# Patient Record
(demographics unavailable — no encounter records)

---

## 2018-05-02 NOTE — RADIOLOGY REPORT (SQ)
EXAM DESCRIPTION:  MRI LUMBAR SPINE WITHOUT



COMPLETED DATE/TIME:  5/2/2018 8:35 am



REASON FOR STUDY:  DEGENERATIVE DISC DISEASE (M51.37) M51.37  OTHER INTERVERTEBRAL DISC DEGENERATION,
 LUMBOSACRAL R



COMPARISON:  None.



TECHNIQUE:  Sagittal and Axial imaging includes T1, T2, STIR and gradient echo sequences. Coronal T2/
HASTE imaging.



LIMITATIONS:  None.



FINDINGS:  VISUALIZED UPPER ABDOMEN:  Limited evaluation. No acute or suspicious findings suggested.

SEGMENTATION: No transitional anatomy. The lowest well-developed disc space is labeled L5-S1.

ALIGNMENT: Anatomic.

VERTEBRAE: Just under 40% height loss at the L1 level.  There is upper endplate fracture line with as
sociated marrow edema.  Minimal retropulsion but no significant central canal narrowing at this level
.  Chronic appearing upper endplate depression at L5.

BONE MARROW: As above.  Marrow signal otherwise looks normal.

DISC SIGNAL: Diminished signal and height at several levels.

POSTERIOR ELEMENTS:  No pars defect.  Mild multilevel facet arthropathy.

HARDWARE: None in the spine.

CORD AND CONUS: Normal in size and signal intensity. Conus at the appropriate level.

SOFT TISSUES: No aortic aneurysm seen. No bulky retroperitoneal adenopathy or mass. No paraspinal mas
s or fluid.

L1-L2: No significant spinal stenosis or exit foraminal stenosis.

L2-L3: No significant spinal stenosis or exit foraminal stenosis.

L3-L4: Mild disc bulge and mild facet overgrowth with slight central narrowing.

L4-L5: Mild broad central protrusion indents the ventral thecal sac.  Facet overgrowth with generally
 mild central canal narrowing.

L5-S1: Tiny central annular fissure.  Minimal protrusion.  No impingement or stenosis.

LOWER THORACIC: Incompletely imaged.  No stenosis seen.

SACRUM: Visualized upper sacrum intact.

OTHER: No other significant findings.



IMPRESSION:  1. Recent appearing L1 mild compression fracture as above.  No significant retropulsion.
  2. Spondylosis otherwise.  No high-grade stenosis.



TECHNICAL DOCUMENTATION:  JOB ID:  [id number]

 2011 [employer]- All Rights Reserved



Reading location - IP/workstation name: [email or web address]

## 2018-05-16 NOTE — WOMENS IMAGING REPORT
EXAM DESCRIPTION:  BONE DENSITY HIP/SPINE



COMPLETED DATE/TIME:  5/16/2018 10:23 am



REASON FOR STUDY:  AGE-RELATED OSTEOPROSIS; M80.08XA M80.08XA  AGE-REL OSTEOPOR W CURRENT PATH FRACTU
RE, VERTEBRA(



COMPARISON:   None.



TECHNIQUE:  Dual-Energy X-ray Absorptiometry (DEXA) of the AP Spine and Hip.



LIMITATIONS:  None.



FINDINGS:  LUMBAR SPINE:

The bone mineral density (BMD) measured from L1-L4 in the AP projection correlates with a T-score of 
-1.8, which is osteopenic as defined by the World Health Organization.

HIP:

The bone mineral density (BMD) measured in the left femoral neck at the hip correlates with a T-score
 of -2.9, which is osteoporotic as defined by the World Health Organization.



IMPRESSION:  1. LUMBAR SPINE: Osteopenic

2.  HIP: Osteoporotic



COMMENT:  The World Health Organization defines low BMD as follows:

T-score:

Normal:  Greater than -1.0

Osteopenia: Between -1.0 and -2.5

Osteoporosis:  Less than -2.5 without fractures

Established osteoporosis:  Less than -2.5 with fractures

In general, you may wish to consider:

Diagnosis          Treatment                     Follow-up DEXA

Normal BMD      Prevention                    2-3 years

Osteopenia       Prevention/Therapy        1-2 years

Osteoporosis     Therapy                        Yearly



TECHNICAL DOCUMENTATION:  JOB ID:  [id number]

 2011 [employer]- All Rights Reserved



Reading location - IP/workstation name: [facility]

## 2018-12-10 NOTE — OPERATIVE REPORT
Operative Report


DATE OF SURGERY: 12/10/18


Operative Report: 





The risks, benefits and alternatives of the procedure including the risk of 

bleeding, perforation requiring surgery are explained to the patient in detail 

and informed consent is obtained.  Patient was taken back to the endoscopy 

suite and placed in the left, lateral decubital position.  Timeout was called.  

Propofol medication is administered.  Rectal examination is done which did not 

reveal any masses, tears or fissures.  An Olympus videoscope is introduced into 

the patient's rectum.  The scope was then carefully advanced all the way to the 

cecum.  The cecum was identified by the usual anatomical landmarks including 

the ileocecal valve as well as the appendiceal office.  Photodocumentation was 

obtained.  The scope was then sequentially pulled back via the various segments 

of the colon including the ascending colon, hepatic flexure, transverse colon, 

splenic flexure, descending colon and finally into the rectosigmoid portions of 

the colon.  Retroflexion maneuver is performed.


PREOPERATIVE DIAGNOSIS: Personal history of polyp


POSTOPERATIVE DIAGNOSIS: Colon polyps x2 in the hepatic flexure the removed via 

snare polypectomy.  Diverticulosis without any evidence of diverticulitis at 

this point.  Internal hemorrhoids


OPERATION: Colonoscopy with snare polypectomy


SURGEON: PANCHO GUIDRY


ANESTHESIA: LMAC


TISSUE REMOVED OR ALTERED: As noted above.


COMPLICATIONS: 





None.


ESTIMATED BLOOD LOSS: None.


INTRAOPERATIVE FINDINGS: As noted above.


PROCEDURE: 





Patient tolerated the procedure well.


No immediate postprocedure complications are noted.


Patient discharged in good condition.


Discharge date 12/10/2018.


Discharge diet: Regular.


Discharge activity: Regular.


2-3-week follow-up to discuss findings


3-5-year surveillance colonoscopy.


I will wait on pathology.


Patient is instructed to call the office or proceed to the emergency room 

should there be any further problems or questions.

## 2019-01-16 NOTE — EKG REPORT
SEVERITY:- OTHERWISE NORMAL ECG -

SINUS RHYTHM

BORDERLINE LEFT AXIS DEVIATION

LOW VOLTAGE IN FRONTAL LEADS

:

Confirmed by: Giovany Alston 16-Jan-2019 17:55:13

## 2019-01-16 NOTE — RADIOLOGY REPORT (SQ)
EXAM DESCRIPTION:  CHEST PA/LATERAL



COMPLETED DATE/TIME:  1/16/2019 9:58 am



REASON FOR STUDY:  PRE-OP K64.9  UNSPECIFIED HEMORRHOIDS Z79.01  LONG TERM (CURRENT) USE OF ANTICOAGU
LANTS



COMPARISON:  None.



NUMBER OF VIEWS:  Two view.



TECHNIQUE:  Frontal and lateral radiographic views of the chest acquired.



LIMITATIONS:  None.



FINDINGS:  LUNGS AND PLEURA: No opacities, masses or pneumothorax. No pleural effusion. Attenuated bl
ood vessels and flattened miya-diaphragms.

MEDIASTINUM AND HILAR STRUCTURES: No masses.  No contour abnormalities.

HEART AND VASCULAR STRUCTURES: Heart normal in size and contour.  No evidence for failure.

BONES: No acute findings.

HARDWARE: None in the chest.

OTHER: No other significant finding.



IMPRESSION:  COPD.  NO ACUTE RADIOGRAPHIC FINDING IN THE CHEST.



TECHNICAL DOCUMENTATION:  JOB ID:  [id number]

 2011 [employer]- All Rights Reserved



Reading location - IP/workstation name: [email or web address]

## 2019-01-24 NOTE — DISCHARGE SUMMARY
Discharge Summary (SDC)





- Discharge


Final Diagnosis: 





bleeding internal hemorrhoids


Date of Surgery: 01/24/19


Discharge Date: 01/24/19


Condition: Stable


Treatment or Instructions: 


d/c home. diet as tolerated. Activity: nonstrenuous. F/u 3 weeks. Fiber 

supplement BID. 5% lidocaine to rectum TID. Sitz baths in warm, soapy water BID 

and after BM's.


Referrals: 


RENNY BECERRIL MD [Primary Care Provider] - 


Discharge Diet: As Tolerated


Respiratory Treatments at Home: Deep Breathing/Coughing, Incentive Spirometer


Discharge Activity: Balance Activity w/Rest


Home Care Assistance: None Needed


Report the Following to Your Physician Immediately: Shortness of Breath, Nausea,

Vomiting, Increase in Pain, Fever over 101 Degrees, Unusual Bleeding, Redness, 

Swelling, Warmth, Increased Soreness

## 2019-01-25 NOTE — OPERATIVE REPORT
Nonrecallable Operative Report


DATE OF SURGERY: 01/24/19


PREOPERATIVE DIAGNOSIS: Bleeding internal hemorrhoids


POSTOPERATIVE DIAGNOSIS: same


OPERATION: Rubber band ligation of internal hemorrhoids x4


SURGEON: PORSHA DUMAS


1ST ASSISTANT: PEYTON CALHOUN


ANESTHESIA: LMAC


TISSUE REMOVED OR ALTERED: none


COMPLICATIONS: 





none apparent


ESTIMATED BLOOD LOSS: minimal


PROCEDURE: 





Procedure in detail: After informed consent was obtained, the patient was 

brought to the operating room and laid in the left lateral decubitus position.  

The anoscope was inserted into the rectum.  Enlarged internal hemorrhoids were 

found in the right  anterior, right posterior, and left lateral columns.  Rubber

bands were placed around the hemorrhoids inside the rectum.  2 rubber bands were

placed in the left lateral position.  4 rubber bands were placed in total.  

After this was completed, the endoscope was removed, and the procedure was 

concluded.  All sponge, instrument, needle counts were correct x2.





Condition: Stable.





Peyton Calhoun PA-C was scrubbed and present the entirety of the procedure.  

She assisted with all portions of procedure including retraction, deployment of 

the rubber bands, placement of the dressing.

## 2020-02-17 NOTE — RADIOLOGY REPORT (SQ)
CLINICAL HISTORY:  hip fracture 



COMPARISON: None.



TECHNIQUE: XR CHEST 2 VIEWS 2/17/2020 4:19 AM CST



FINDINGS: 



Cardiac silhouette is normal in size. Lungs are clear without

consolidation, atelectasis, mass or edema. There is no pleural

effusion. There is no pneumothorax. There are no acute osseous

findings.



IMPRESSION: 



Clear lungs.

## 2020-02-17 NOTE — PDOC H&P
History of Present Illness


Admission Date/PCP: 


  02/17/20 06:29





  RENNY BECERRIL MD





History of Present Illness: 


KENTON THOMAS is a 71 year old female


71-year-old white female recently relocated from [facility] and a past 

medical history significant for osteoporosis who fell at home earlier today and 

sustained a right hip injury.  She was unable to weight-bear.  She is brought to

the emergency room by EMS.  In the emergency room x-rays indicated the presence 

of a displaced right femoral neck fracture.  Orthopedics is consulted for 

fracture management.





Past Medical History


Cardiac Medical History: 


   Denies: Coronary Artery Disease, Myocardial Infarction, Hypertension


Pulmonary Medical History: Reports: Asthma, Bronchitis, Chronic Obstructive 

Pulmonary Disease (COPD), Pneumonia


Neurological Medical History: 


   Denies: Seizures


Musculoskeltal Medical History: 


   Denies: Arthritis


Hematology: 


   Denies: Anemia





Past Surgical History


Past Surgical History: Reports: None





Social History


Information Source: Patient, [facility] Records


Smoking Status: Unknown if Ever Smoked





Family History


Family History: Reviewed & Not Pertinent


Parental Family History Reviewed: No


Children Family History Reviewed: No


Sibling(s) Family History Reviewed.: No





Medication/Allergy


Home Medications: 








Albuterol Sulfate [Ventolin Hfa 8 gm Mdi (1 Mdi/ER Disp)] 2 puff IH ASDIR PRN 

12/05/18 


Cyclobenzaprine HCl [Flexeril 10 mg Tablet] 10 mg PO QHS 12/05/18 


Gabapentin 600 mg PO TID 12/05/18 


Nitroglycerin 0.4 mg SL ASDIR PRN 12/05/18 


Tramadol HCl [Ultram] 50 mg PO TID 12/05/18 


Fiber [Fiber Off] 1 each PO QID 01/16/19 








Allergies/Adverse Reactions: 


                                        





Sulfa (Sulfonamide Antibiotics) Allergy (Severe, Verified 01/16/19 09:08)


   Anaphylaxis


aspirin Adverse Reaction (Severe, Verified 01/16/19 09:08)


   STOMACH UPSET











Review of Systems


All systems: as per PMH





Physical Exam


Vital Signs: 


                                        











Temp Pulse Resp BP Pulse Ox


 


       15   147/73 H  94 


 


       02/17/20 05:01  02/17/20 05:01  02/17/20 05:01








                                 Intake & Output











 02/15/20 02/16/20 02/17/20





 06:59 06:59 06:59


 


Weight   63.9 kg











Physical Exam: 





Moderately built middle-aged white female lying on ER gurney.   in a 

chair next to her.  Patient's in significant distress at this point.


General appearance: PRESENT: severe distress, well-developed, well-nourished


Head exam: PRESENT: normocephalic


Respiratory exam: PRESENT: unlabored


Cardiovascular exam: PRESENT: RRR


Pulses: PRESENT: +1 pedal pulses bilateral


Vascular exam: PRESENT: normal capillary refill


GI/Abdominal exam: PRESENT: soft


Rectal exam: PRESENT: deferred


Extremities exam: PRESENT: other - Right lower extremity is actually rotated and

shortened.  Distally there is brisk capillary refill and the patient is able to 

flex and extend her great toe.


Neurological exam: PRESENT: alert, awake, oriented to person, oriented to place,

oriented to time, oriented to situation.  ABSENT: motor sensory deficit


Psychiatric exam: PRESENT: appropriate affect, normal mood.  ABSENT: homicidal 

ideation, suicidal ideation


Skin exam: PRESENT: dry, intact, warm.  ABSENT: cyanosis, rash





Results


Laboratory Results: 


                                        





                                 02/17/20 04:50 





                                 02/17/20 04:50 





                                        











  02/17/20 02/17/20 02/17/20





  04:50 04:50 05:35


 


WBC  12.0 H  


 


RBC  4.46  


 


Hgb  14.5  


 


Hct  43.2  


 


MCV  97  


 


MCH  32.6  


 


MCHC  33.6  


 


RDW  13.6  


 


Plt Count  257  


 


Seg Neutrophils %  84.9 H  


 


Sodium   137.4 


 


Potassium   4.2 


 


Chloride   101 


 


Carbon Dioxide   31 H 


 


Anion Gap   5 


 


BUN   20 


 


Creatinine   0.96 


 


Est GFR ( Amer)   > 60 


 


Glucose   105 


 


Calcium   8.9 


 


Urine Color    YELLOW


 


Urine Appearance    CLEAR


 


Urine pH    7.0


 


Ur Specific Gravity    1.016


 


Urine Protein    NEGATIVE


 


Urine Glucose (UA)    NEGATIVE


 


Urine Ketones    NEGATIVE


 


Urine Blood    NEGATIVE


 


Urine Nitrite    NEGATIVE


 


Ur Leukocyte Esterase    NEGATIVE


 


Urine WBC (Auto)    1


 


Urine RBC (Auto)    15











Impressions: 


                                        





Hip/Pelvis X-Ray  02/17/20 00:00


IMPRESSION:     


Acute, minimally displaced fracture through the lateral aspect of


the right femoral neck.


 








Chest X-Ray  02/17/20 04:19


IMPRESSION: 


 


Clear lungs.


 











Status: Imported from PACS





Assessment & Plan





- Diagnosis


(1) Femoral neck fracture


Qualifiers: 


   Encounter type: initial encounter   Fracture type: closed   Laterality: right

  Qualified Code(s): S72.001A - Fracture of unspecified part of neck of right 

femur, initial encounter for closed fracture   


Is this a current diagnosis for this admission?: Yes   


Plan: 


Plan for right hip arthroplasty pending anesthesia clearance








(2) Osteoporosis


Is this a current diagnosis for this admission?: Yes   


Plan: 


Was documented with osteoporosis on a DEXA scan in 2018.  She states that she 

was unable to tolerate osteoporosis medication (? bisphosphonate) but has been 

taking calcium vitamin D in the interim.  Plan will be to to address her 

osteoporosis once we have dealt with the hip fracture.  








- Time


Time Spent: 50 to 70 Minutes


Anticipated discharge: Home with Homehealth


Within: within 72 hours

## 2020-02-17 NOTE — RADIOLOGY REPORT (SQ)
EXAM:

  XR Right Hip With Pelvis When Performed, 2 Views



EXAM DATE/TIME:

  02/17/2020 4:43 AM



CLINICAL HISTORY:

  The patient is 71 years old and is Female; fall; hip right pain



TECHNIQUE:

  Two views of the right hip with pelvis when performed.



COMPARISON:

  No relevant prior studies available.



FINDINGS:

  BONES/JOINTS:  The bilateral hip joints are well-maintained. No

dislocation.  There is an acute, minimally displaced fracture

through the lateral aspect of the right femoral neck. No

additional acute fractures visualized.

  SOFT TISSUES:  No significant soft tissue abnormalities

visualized.



IMPRESSION:     

Acute, minimally displaced fracture through the lateral aspect of

the right femoral neck.

## 2020-02-17 NOTE — EKG REPORT
SEVERITY:- ABNORMAL ECG -

SINUS RHYTHM

BORDERLINE LEFT AXIS DEVIATION

LOW VOLTAGE EKG

:

Confirmed by: Ian Olievr MD 17-Feb-2020 06:05:21

## 2020-02-17 NOTE — ER DOCUMENT REPORT
ED Hip Pain/Injury





- General


Chief Complaint: Hip Pain


Stated Complaint: INNER THIGH PAIN


Time Seen by Provider: 02/17/20 04:11


Primary Care Provider: 


RENNY BECERRIL MD [Primary Care Provider] - Follow up as needed


Notes: 





CHIEF COMPLAINT: Right hip pain status post fall





HPI: 71-year-old female with history of fibromyalgia presenting for evaluation 

of right hip pain status post fall.  Patient tripped and landed on the right 

hip.  Denies hitting her head.  States that she was not able to get up or 

weight-bear afterwards, EMS reports they did give 100 mcg fentanyl for pain.  

Patient denies knee pain or ankle pain.  She states she cannot fully straighten 

her leg secondary to the discomfort.





ROS: See HPI - all other systems were reviewed and are otherwise negative


Constitutional: no fever 


Eyes: no drainage, no blurred vision


ENT: no runny nose, no sore throat


Cardiovascular:  no chest pain 


Resp: no SOB, no cough


GI: no vomiting, no diarrhea, no abdominal pain


: no dysuria


Integumentary: no rash 


Allergy: no hives 


Musculoskeletal: + extremity pain or swelling 


Neurological: no numbness/tingling





MEDICATIONS: I agree with the patient medications as charted by the RN.





ALLERGIES: I agree with the allergies as charted by the RN.





PAST MEDICAL HISTORY/PAST SURGICAL HISTORY: Reviewed and agree as charted by RN.





SOCIAL HISTORY: Reviewed and agree as charted by RN.





FAMILY HISTORY: No significant familial comorbid conditions directly related to 

patient complaint





EXAM:


Reviewed vital signs as charted by RN.


CONSTITUTIONAL: Alert and oriented and responds appropriately to questions. 

Well-appearing; well-nourished, mild acute distress secondary to pain


HEAD: Normocephalic; atraumatic


EYES: PERRL; Conjunctivae clear, sclerae non-icteric


ENT: normal nose; no rhinorrhea; moist mucous membranes; pharynx without lesions

noted, no uvula edema or deviation, no tonsillar hypertrophy, phonation normal


NECK: Supple without meningismus; non-tender; no cervical lymphadenopathy, no 

masses


CARD: RRR; no murmurs, no clicks, no rubs, no gallops; symmetric distal pulses


RESP: Normal chest excursion without splinting or tachypnea; breath sounds clear

and equal bilaterally; no wheezes, no rhonchi, no rales, pulse oximetry 97% on 

room air not hypoxic


ABD/GI: Normal bowel sounds; non-distended; soft, non-tender, no rebound, no 

guarding; no palpable organomegaly or masses.


BACK:  The back appears normal and is non-tender to palpation, there is no CVA 

tenderness


EXT: Patient is unable to straighten the right leg at the hip secondary to 

complaints of pain.  She has moderate tenderness around the right hip girdle on 

palpation.  No discomfort to the distal femur, knee, right lower extremity.  

Sensation is intact in the distal right lower extremity to touch with capillary 

refill less than 3 seconds.  Dorsalis pedis and posterior tibial pulses are 

present in the right foot and ankle.   


SKIN: Normal color for age and race; warm; dry; good turgor; no acute lesions 

noted


NEURO: Motor and sensory function intact 


PSYCH: The patient's mood and manner are appropriate. Grooming and personal 

hygiene are appropriate.





MDM: 71-year-old female with a mechanical fall with probable right hip fracture 

will obtain x-ray


TRAVEL OUTSIDE OF THE [place] IN LAST 30 DAYS: No





- Related Data


Allergies/Adverse Reactions: 


                                        





Sulfa (Sulfonamide Antibiotics) Allergy (Severe, Verified 01/16/19 09:08)


   Anaphylaxis


aspirin Adverse Reaction (Severe, Verified 01/16/19 09:08)


   STOMACH UPSET











Past Medical History





- Social History


Smoking Status: Unknown if Ever Smoked


Family History: Reviewed & Not Pertinent





- Past Medical History


Cardiac Medical History: 


   Denies: Hx Coronary Artery Disease, Hx Heart Attack, Hx Hypertension


Pulmonary Medical History: Reports: Hx Asthma, Hx Bronchitis, Hx COPD, Hx 

Pneumonia


Neurological Medical History: Denies: Hx Cerebrovascular Accident, Hx Seizures


Musculoskeletal Medical History: Denies Hx Arthritis





- Immunizations


Hx Diphtheria, Pertussis, Tetanus Vaccination: Yes





Physical Exam





- Vital signs


Vitals: 


                                        











Resp Pulse Ox


 


 16   91 L


 


 02/17/20 04:11  02/17/20 04:11














Course





- Re-evaluation


Re-evalutation: 





02/17/20 04:58


Patient appears to have a femoral neck fracture on my review of her x-ray.  

Awaiting the official radiology review, will discuss with orthopedics for 

management


02/17/20 06:01


Patient with a right femoral neck fracture by radiologist, labs do not show 

acute emergent abnormalities, have paged Dr. Disla orthopedics


02/17/20 06:19


spoke with Dr. Disla, Orthopedics.  Case was discussed, they will admit to 

their service





- Vital Signs


Vital signs: 


                                        











Temp Pulse Resp BP Pulse Ox


 


       15   147/73 H  94 


 


       02/17/20 05:01  02/17/20 05:01  02/17/20 05:01














- Laboratory


Result Diagrams: 


                                 02/17/20 04:50





                                 02/17/20 04:50


Laboratory results interpreted by me: 


                                        











  02/17/20 02/17/20





  04:50 04:50


 


WBC  12.0 H 


 


Lymph % (Auto)  10.0 L 


 


Absolute Neuts (auto)  10.2 H 


 


Seg Neutrophils %  84.9 H 


 


Carbon Dioxide   31 H


 


Est GFR (MDRD) Non-Af   57 L














Discharge





- Discharge


Clinical Impression: 


Fall


Qualifiers:


 Encounter type: initial encounter Qualified Code(s): W19.XXXA - Unspecified 

fall, initial encounter





Femoral neck fracture


Qualifiers:


 Encounter type: initial encounter Fracture type: closed Laterality: right 

Qualified Code(s): S72.001A - Fracture of unspecified part of neck of right 

femur, initial encounter for closed fracture





Condition: Stable


Disposition: ADMITTED AS INPATIENT


Admitting Provider: Dr. Disla, Orthopedics


Unit Admitted: Surgical Floor


Referrals: 


RENNY BECERRIL MD [Primary Care Provider] - Follow up as needed

## 2020-02-17 NOTE — OPERATIVE REPORT
Operative Report


DATE OF SURGERY: 02/17/20


PREOPERATIVE DIAGNOSIS: Right femoral neck fracture


OPERATION: Right hip arthroplasty


SURGEON: JOEY MEADE


ANESTHESIA: Spinal


TISSUE REMOVED OR ALTERED: Femoral head to pathology


ESTIMATED BLOOD LOSS: 75


PROCEDURE: 





Implants used:


Femur: Davide Accolade 2 stem, size 4





Acetabular shell: 52  mm hemispherical shell





Liner: 86 mm flat cross-link polyethylene liner





Head: 36 mm chrome cobalt head +5 neck


The patient is placed in a left lateral decubitus position on the operating 

table.  The right lower extremity and hindquarter is prepped and draped in a 

sterile fashion.  A curvilinear incision was made over the greater trochanter a 

posterior approach the hip was taken.   the femoral neck transected using an 

oscillating saw and the femoral head is removed using a corkscrew..





Attention was next turned to the acetabulum.  Soft tissues cleared off the 

acetabulum using electrocautery.  The acetabulum was then prepared using a 

series of hemispherical reamers until a 52 millimeters reamer is seated.  

Subsequently a 52 millimeters Davide titanium hemispherical shell is impacted 

into position and secured with one screw.  A standard flat 36 millimeters cross-

link liner is impacted into the shell.  Attention was next turned to the femur.





Access is gained to the femoral canal using a box osteotome to the piriformis fo

ssa.  The femur is then prepared using a series of broaches until a number 4 

broach is seated.  A trial reduction was now performed using a 36 millimeters 

head with +5 neck.  Preoperative leg length was recreated and is excellent 

anterior posterior stability.  A decision was made to proceed with the above 

construct.





All trial implants were removed.  The wound is irrigated with pulsed lavage.  A 

number 4 stem is impacted into the femoral canal.  A trial reduction was again 

performed with a 36 mm head and a +5 neck.  Findings as previously.  The hip was

dislocated one last time and the final chrome-cobalt head is impacted onto the 

trunnion.  The hip was reduced.  Wound is copiously irrigated with pulsed 

lavage.  Sent closed in layers using interrupted Vicryl followed by staples.  A 

sterile dressing is applied and the patient's returned to recovery room in 

satisfactory patient.

## 2020-02-18 NOTE — PDOC PROGRESS REPORT
Subjective


Progress Note for:: 02/18/20


Reason For Visit: 


FALL,FEMORAL NECK FRACTURE


71-year-old white female now postop day 1 status post right hip arthroplasty for

femoral neck fracture.  Patient is comfortable this morning but pain control 

yesterday was problematic.  Patient was not seen by physical therapy yesterday 

because of the time of her arrival to the floor which was late





Physical Exam


Vital Signs: 


                                        











Temp Pulse Resp BP Pulse Ox


 


 36.9 C   94   16   126/46 H  91 L


 


 02/18/20 04:50  02/18/20 04:50  02/18/20 04:50  02/18/20 04:50  02/18/20 04:50








                                 Intake & Output











 02/17/20 02/18/20 02/19/20





 06:59 06:59 06:59


 


Intake Total  5692 


 


Output Total  4909 


 


Balance  783 


 


Weight 63.9 kg 66.1 kg 











Physical Exam: 





Patient is alert, oriented, and somewhat appropriate.  Apprehension when I 

placed my hand on her leg without moving the leg results in bracing on the bed 

rails as well as a trapeze anticipation of pain.


General appearance: PRESENT: no acute distress, mild distress


Head exam: PRESENT: normocephalic


Respiratory exam: PRESENT: unlabored


Cardiovascular exam: PRESENT: RRR


GI/Abdominal exam: PRESENT: soft


Rectal exam: PRESENT: deferred


Musculoskeletal exam: PRESENT: other - Right hip dressing clean dry and intact. 

Leg lengths are equal.  Distal neurovascular examination is intact.


Neurological exam: PRESENT: alert, awake, oriented to person, oriented to place,

oriented to time, oriented to situation.  ABSENT: motor sensory deficit


Psychiatric exam: PRESENT: anxious


Skin exam: PRESENT: dry, intact, warm.  ABSENT: cyanosis, rash





Results


Laboratory Results: 


                                        





                                 02/18/20 05:50 





                                 02/18/20 05:50 





                                        











  02/18/20 02/18/20





  05:50 05:50


 


WBC  9.0 


 


RBC  4.01 


 


Hgb  13.1 


 


Hct  38.9 


 


MCV  97 


 


MCH  32.8 


 


MCHC  33.8 


 


RDW  13.5 


 


Plt Count  170 


 


Sodium   136.1 L


 


Potassium   4.8


 


Chloride   103


 


Carbon Dioxide   30


 


Anion Gap   3 L


 


BUN   13


 


Creatinine   0.86


 


Est GFR (African Amer)   > 60


 


Glucose   118 H


 


Calcium   8.4











Impressions: 


                                        





Hip/Pelvis X-Ray  02/17/20 00:00


IMPRESSION:     


Acute, minimally displaced fracture through the lateral aspect of


the right femoral neck.


 








Chest X-Ray  02/17/20 04:19


IMPRESSION: 


 


Clear lungs.


 











Status: Imported from PACS





Assessment & Plan





- Diagnosis


(1) Femoral neck fracture


Qualifiers: 


   Encounter type: initial encounter   Fracture type: closed   Laterality: right

  Qualified Code(s): S72.001A - Fracture of unspecified part of neck of right 

femur, initial encounter for closed fracture   


Is this a current diagnosis for this admission?: Yes   


Plan: 


Mobilize with physical therapy and a touchdown weightbearing restriction








(2) Osteoporosis


Is this a current diagnosis for this admission?: Yes   





- Time


Time Spent with patient: 15-24 minutes


Anticipated discharge: Home with Homehealth


Within: Other

## 2020-02-18 NOTE — RADIOLOGY REPORT (SQ)
EXAM DESCRIPTION:  CTA CHEST



COMPLETED DATE/TIME:  2/18/2020 5:21 pm



REASON FOR STUDY:  Hypoxemia, hip fracture surgery



COMPARISON:  None.



TECHNIQUE:  CT scan of the chest performed using helical scanning technique with dynamic intravenous 
contrast injection.  Images reviewed with lung, soft tissue and bone windows.  Reconstructed coronal 
and sagittal MPR images reviewed.

Additional 3 dimensional post-processing performed to develop Maximal Intensity Projection images (MI
P).  All images stored on PACS.

All CT scanners at this facility use dose modulation, iterative reconstruction, and/or weight based d
osing when appropriate to reduce radiation dose to as low as reasonably achievable (ALARA).

[facility]: Dose Right  [facility]: CareDose    [facility]: Dose Right    [facility]: Teradose 4D    [facility]: Smart Technologies



CONTRAST TYPE AND DOSE:  contrast/concentration: Isovue 350.00 mg/ml; Total Contrast Delivered: 52.0 
ml; Total Saline Delivered: 78.0 ml

Contrast bolus adequate for pulmonary arteries and aorta.



RENAL FUNCTION:  BUN 13 creatinine 0.86



RADIATION DOSE:  CT Rad equipment meets quality standard of care and radiation dose reduction techniq
ues were employed. CTDIvol: 6.6 - 14.3 mGy. DLP: 550 mGy-cm. .



LIMITATIONS:  None.



FINDINGS:  LUNGS AND PLEURA: Mild centrilobular emphysema.  Marked opacification in the right lower l
obe.  Mild dependent atelectasis in the left lower lobe posteriorly.

AORTA AND GREAT VESSELS: No aneurysm. No dissection.

HEART: No pericardial effusion. Prior CABG.

PULMONARY ARTERIES: No emboli visualized in the main pulmonary arteries or the segmental branches.

HILAR AND MEDIASTINAL STRUCTURES: Mild left hilar adenopathy.  No mediastinal mass or adenopathy.

HARDWARE: None in the chest.

UPPER ABDOMEN: No significant findings.  Limited exam.

THYROID AND OTHER SOFT TISSUES: No masses.  No adenopathy.

BONES: No acute or significant finding.

3D MIPS: Confirm above findings.

OTHER: No other significant finding.



IMPRESSION:  1.  There is no pulmonary embolus.  There is no aortic aneurysm or dissection.

2.  Right lower lobe atelectasis.

3.  Subsegmental dependent atelectasis in left lower lobe.

4.  Mild left hilar adenopathy.



COMMENT:  Quality ID # [id number]: Final reports with documentation of one or more dose reduction techniques
 (e.g., Automated exposure control, adjustment of the mA and/or kV according to patient size, use of 
iterative reconstruction technique)



TECHNICAL DOCUMENTATION:  JOB ID:  [id number]

 2011 [employer]- All Rights Reserved



Reading location - IP/workstation name: [email or web address]

## 2020-02-18 NOTE — PDOC CONSULTATION
Consultation


Consult Date: 02/18/20


Attending physician:: JOEY MEADE


Provider Consulted: TONJA MORRIS


Consult reason:: Hypoxemia





History of Present Illness


Admission Date/PCP: 


  02/17/20 06:29





  RENNY BECERRIL MD





History of Present Illness: 


KENTON THOMAS is a 71 year old female was admitted by the orthopedic service 

for a femoral neck fracture.  She has a history of COPD but says that she does 

not use anything but and as needed albuterol inhaler at home.  She had surgery 

yesterday and today was getting out of bed with PT for the first time she got a 

little dizzy.  Her vitals were checked and her pulse oximetry showed that her 

oxygen levels were low.  She had oxygen put on the head of bed at about 6 L to 

get her in the mid 90s.  Patient says she felt fine felt like she recovered 

completely.  We took off her oxygen and waited a while and then checked her bloo

d gas and that showed that her PO2 on room air was low and her saturation on her

blood gas was only 77%.  Patient was asymptomatic with this.  She was not 

complaining of any chest pain or shortness of breath.  She has evidence of COPD 

with hyperinflated lungs, flattened diaphragms, and basilar scarring on chest x-

ray.  She has had foot pumps on and has not been on any anticoagulation.








Past Medical History


Cardiac Medical History: 


   Denies: Coronary Artery Disease, Myocardial Infarction, Hypertension


Pulmonary Medical History: Reports: Asthma, Bronchitis, Chronic Obstructive 

Pulmonary Disease (COPD), Pneumonia


Neurological Medical History: 


   Denies: Seizures


Musculoskeltal Medical History: 


   Denies: Arthritis


Hematology: 


   Denies: Anemia





Past Surgical History


Past Surgical History: Reports: None





Social History


Smoking Status: Unknown if Ever Smoked





- Advance Directive


Resuscitation Status: Full Code





Family History


Family History: Reviewed & Not Pertinent


Parental Family History Reviewed: Yes


Children Family History Reviewed: Yes


Sibling(s) Family History Reviewed.: Yes





Medication/Allergy


Home Medications: 








Albuterol Sulfate [Ventolin Hfa 8 gm Mdi (1 Mdi/ER Disp)] 2 puff IH Q6HP PRN 

12/05/18 


Tramadol HCl [Ultram] 50 mg PO Q8HP PRN  MG (1-2 TAB PER DOSE) 12/05/18 


Cyclobenzaprine HCl [Flexeril 10 mg Tablet] 10 mg PO TIDP PRN 02/17/20 


Gabapentin [Neurontin] 600 mg PO Q8 02/17/20 








Allergies/Adverse Reactions: 


                                        





Sulfa (Sulfonamide Antibiotics) Allergy (Severe, Verified 01/16/19 09:08)


   Anaphylaxis


aspirin Adverse Reaction (Severe, Verified 01/16/19 09:08)


   STOMACH UPSET











Review of Systems


All systems: reviewed and no additional remarkable complaints except as stated -

All systems were reviewed and were negative except as noted in the HPI





Physical Exam


Vital Signs: 


                                        











Temp Pulse Resp BP Pulse Ox


 


 98.2 F   85   20   101/49 L  86 L


 


 02/18/20 10:56  02/18/20 11:30  02/18/20 11:30  02/18/20 10:56  02/18/20 14:54








                                 Intake & Output











 02/17/20 02/18/20 02/19/20





 06:59 06:59 06:59


 


Intake Total  5942 380


 


Output Total  5234 600


 


Balance  708 -220


 


Weight 63.9 kg 66.1 kg 











General appearance: PRESENT: no acute distress, cooperative, disheveled


Head exam: PRESENT: atraumatic, normocephalic


Eye exam: PRESENT: EOMI, PERRLA.  ABSENT: conjunctival injection, nystagmus, 

scleral icterus


Ear exam: PRESENT: normal external ear exam


Mouth exam: PRESENT: moist, neck supple


Throat exam: ABSENT: post pharyngeal erythema


Neck exam: PRESENT: full ROM.  ABSENT: carotid bruit, JVD, lymphadenopathy, 

meningismus, tenderness, thyromegaly


Respiratory exam: PRESENT: decreased breath sounds, prolonged expiratory phas, 

symmetrical, unlabored.  ABSENT: accessory muscle use, chest wall tenderness, 

crackles, retraction, rhonchi, tachypnea, wheezes


Cardiovascular exam: PRESENT: RRR, +S1, +S2.  ABSENT: diastolic murmur, systolic

murmur


Pulses: PRESENT: normal carotid pulses


Vascular exam: PRESENT: normal capillary refill


GI/Abdominal exam: PRESENT: normal bowel sounds, soft.  ABSENT: distended, 

guarding, rebound, tenderness


Extremities exam: PRESENT: other - Clean bandage on right hip.  ABSENT: 

clubbing, pedal edema


Musculoskeletal exam: PRESENT: normal inspection.  ABSENT: deformity


Neurological exam: PRESENT: alert, awake, oriented to person, oriented to place,

oriented to time, oriented to situation, CN II-XII grossly intact.  ABSENT: 

motor sensory deficit


Psychiatric exam: PRESENT: appropriate affect, normal mood


Skin exam: PRESENT: dry, warm, other - Has a dusky overall appearance





Results


Laboratory Results: 


                                        





                                 02/18/20 05:50 





                                 02/18/20 05:50 





                                        











  02/18/20 02/18/20 02/18/20





  05:50 05:50 13:05


 


WBC  9.0  


 


RBC  4.01  


 


Hgb  13.1  


 


Hct  38.9  


 


MCV  97  


 


MCH  32.8  


 


MCHC  33.8  


 


RDW  13.5  


 


Plt Count  170  


 


Carbonic Acid    1.18


 


HCO3/H2CO3 Ratio    21:1


 


ABG pH    7.42


 


ABG pCO2    39.3


 


ABG pO2    40.8 L*


 


ABG HCO3    24.8 H


 


ABG O2 Saturation    77.1 L


 


ABG Base Excess    0.4


 


FiO2    ROOM AIR


 


Sodium   136.1 L 


 


Potassium   4.8 


 


Chloride   103 


 


Carbon Dioxide   30 


 


Anion Gap   3 L 


 


BUN   13 


 


Creatinine   0.86 


 


Est GFR ( Amer)   > 60 


 


Glucose   118 H 


 


Calcium   8.4 











Impressions: 


                                        





Hip/Pelvis X-Ray  02/17/20 00:00


IMPRESSION:     


Acute, minimally displaced fracture through the lateral aspect of


the right femoral neck.


 








Chest X-Ray  02/18/20 00:00


IMPRESSION:  Obstructive lung disease


Question early or developing infiltrate at the right lung base


 














Assessment and Plan





- Diagnosis


(1) Hypoxemia


Is this a current diagnosis for this admission?: Yes   


Plan: 


She is hypoxic but otherwise shows no signs of distress whatsoever.  I suspect 

her underlying COPD and likely underlying pulmonary fibrosis predispose her to a

typically low SPO2 at baseline.  However, given the fact that she seemed to have

an acute drop in SPO2, and that she has not been on anticoagulation 

postoperatively, I will order a CTA of the chest to rule out PE.  I have also 

ordered incentive spirometry.  In this patient I would not be terribly surprised

to have a negative CTA for blood clot.








(2) Femoral neck fracture


Qualifiers: 


   Encounter type: initial encounter   Fracture type: closed   Laterality: right

  Qualified Code(s): S72.001A - Fracture of unspecified part of neck of right 

femur, initial encounter for closed fracture   


Is this a current diagnosis for this admission?: Yes   


Plan: 


Per surgery








- Time


Time Spent with patient: 35 or more minutes Advance care planning Nasal trauma Advance care planning Nasal trauma Nasal trauma Advance care planning Advance care planning

## 2020-02-18 NOTE — RADIOLOGY REPORT (SQ)
EXAM DESCRIPTION:  CHEST SINGLE VIEW



COMPLETED DATE/TIME:  2/18/2020 11:27 am



REASON FOR STUDY:  sob



COMPARISON:  Chest films 2/17/2020, 1/16/2019



EXAM PARAMETERS:  NUMBER OF VIEWS: One view.

TECHNIQUE: Single frontal radiographic view of the chest acquired.

RADIATION DOSE: NA

LIMITATIONS: None.



FINDINGS:  LUNGS AND PLEURA: Lungs are hyperinflated and hyperlucent from obstructive disease.

Question early or developing infiltrate at the right lung base.

No pleural effusion or pneumothorax.

MEDIASTINUM AND HILAR STRUCTURES: No masses.  Contour normal.

HEART AND VASCULAR STRUCTURES: Heart normal in size.  Normal vasculature.

BONES: No acute findings.

HARDWARE: Multiple surgical clips in the left upper quadrant.

OTHER: No other significant finding.



IMPRESSION:  Obstructive lung disease

Question early or developing infiltrate at the right lung base



TECHNICAL DOCUMENTATION:  JOB ID:  [id number]

 2011 [employer]- All Rights Reserved



Reading location - IP/workstation name: [email or web address]

## 2020-02-19 NOTE — PDOC PROGRESS REPORT
Subjective


Progress Note for:: 02/19/20


Subjective:: 





No adverse events overnight.  She had a brief fever yesterday evening but has 

defervesced and has not had any more fever since then.  No cough or shortness of

breath.  No chest pain.  The CT of her chest yesterday was negative for PE but 

showed some lower lobe atelectasis bilaterally.


Reason For Visit: 


FALL,FEMORAL NECK FRACTURE








Physical Exam


Vital Signs: 


                                        











Temp Pulse Resp BP Pulse Ox


 


 99.2 F   90   20   111/52 L  93 


 


 02/19/20 11:50  02/19/20 13:57  02/19/20 13:57  02/19/20 11:50  02/19/20 13:57








                                 Intake & Output











 02/18/20 02/19/20 02/20/20





 06:59 06:59 06:59


 


Intake Total 5942 1700 480


 


Output Total 5234 1100 200


 


Balance 708 600 280


 


Weight 66.1 kg 66.2 kg 








General appearance: PRESENT: no acute distress, cooperative, disheveled


Respiratory exam: PRESENT: decreased breath sounds, prolonged expiratory phas, 

symmetrical, unlabored.  ABSENT: accessory muscle use, chest wall tenderness, 

crackles, retraction, rhonchi, tachypnea, wheezes


Cardiovascular exam: PRESENT: RRR, +S1, +S2.  ABSENT: diastolic murmur, systolic

murmur


Pulses: PRESENT: normal carotid pulses


Vascular exam: PRESENT: normal capillary refill


GI/Abdominal exam: PRESENT: normal bowel sounds, soft.  ABSENT: distended, 

guarding, rebound, tenderness


Extremities exam: PRESENT: other - Clean bandage on right hip.  ABSENT: 

clubbing, pedal edema


Musculoskeletal exam: PRESENT: normal inspection.  ABSENT: deformity


Neurological exam: PRESENT: alert, awake, oriented to person, oriented to place,

oriented to time, oriented to situation


Psychiatric exam: PRESENT: appropriate affect, normal mood


Skin exam: PRESENT: dry, warm, other - Has a dusky overall appearance





Results


Laboratory Results: 


                                        





                                 02/19/20 05:48 





                                 02/18/20 05:50 





                                        











  02/19/20





  05:48


 


WBC  15.0 H


 


RBC  4.17


 


Hgb  13.7


 


Hct  40.1


 


MCV  96


 


MCH  32.8


 


MCHC  34.1


 


RDW  13.4


 


Plt Count  147 L











Impressions: 


                                        





Hip/Pelvis X-Ray  02/17/20 00:00


IMPRESSION:     


Acute, minimally displaced fracture through the lateral aspect of


the right femoral neck.


 








Chest X-Ray  02/18/20 00:00


IMPRESSION:  Obstructive lung disease


Question early or developing infiltrate at the right lung base


 








Chest/Abdomen CTA  02/18/20 00:00


IMPRESSION:  1.  There is no pulmonary embolus.  There is no aortic aneurysm or 

dissection.


2.  Right lower lobe atelectasis.


3.  Subsegmental dependent atelectasis in left lower lobe.


4.  Mild left hilar adenopathy.


 














Assessment and Plan





- Diagnosis


(1) Hypoxemia


Is this a current diagnosis for this admission?: Yes   


Plan: 


She did have fairly diffuse centrilobular emphysema with hyperinflated lungs on 

her chest CT.  We have encouraged incentive spirometry and use of a flutter 

valve.  We will keep a close eye on her in case the areas of atelectasis wind up

turning into a breeding ground for pneumonia.  She will likely have to be 

discharged on some oxygen because she is on 4 to 6 L and her SPO2 is in the low 

90s.








(2) Femoral neck fracture


Qualifiers: 


   Encounter type: initial encounter   Fracture type: closed   Laterality: right

  Qualified Code(s): S72.001A - Fracture of unspecified part of neck of right 

femur, initial encounter for closed fracture   


Is this a current diagnosis for this admission?: Yes   


Plan: 


Per surgery.  She does not want to go to a skilled nursing facility given how 

she has performed so far I do not see how that is avoidable.








- Time


Time Spent with patient: 15-24 minutes

## 2020-02-19 NOTE — PDOC PROGRESS REPORT
Subjective


Progress Note for:: 02/19/20


Reason For Visit: 


FALL,FEMORAL NECK FRACTURE


71-year-old white female now postop day 2 status post right hip arthroplasty for

femoral neck fracture.  Patient with low oxygen levels yesterday underwent a 

hospitalist consult and a CTA which was negative for PE.  The patient in better 

spirits this morning.  She is alert oriented and cooperative.





Physical Exam


Vital Signs: 


                                        











Temp Pulse Resp BP Pulse Ox


 


 36.8 C   87   18   129/55 H  89 L


 


 02/19/20 00:22  02/19/20 02:49  02/19/20 02:49  02/19/20 00:22  02/19/20 02:49








                                 Intake & Output











 02/17/20 02/18/20 02/19/20





 06:59 06:59 06:59


 


Intake Total  5942 700


 


Output Total  5234 1100


 


Balance  708 -400


 


Weight 63.9 kg 66.1 kg 66.2 kg











General appearance: PRESENT: no acute distress, mild distress


Head exam: PRESENT: normocephalic


Respiratory exam: PRESENT: unlabored


Cardiovascular exam: PRESENT: RRR


Pulses: PRESENT: +1 pedal pulses bilateral


Vascular exam: PRESENT: normal capillary refill


GI/Abdominal exam: PRESENT: soft


Rectal exam: PRESENT: deferred


Extremities exam: PRESENT: other - Right hip dressing clean dry and intact.  I 

am able to passively roll her lower extremity internally and externally without 

any discomfort.  Leg lengths are equal.  Distal neurovascular examination is 

intact.


Neurological exam: PRESENT: alert, awake, oriented to person, oriented to place,

oriented to time, oriented to situation.  ABSENT: motor sensory deficit


Psychiatric exam: PRESENT: appropriate affect, normal mood.  ABSENT: homicidal 

ideation, suicidal ideation





Results


Laboratory Results: 


                                        





                                 02/19/20 05:48 





                                 02/18/20 05:50 





                                        











  02/18/20 02/18/20 02/19/20





  05:50 13:05 05:48


 


WBC    15.0 H


 


RBC    4.17


 


Hgb    13.7


 


Hct    40.1


 


MCV    96


 


MCH    32.8


 


MCHC    34.1


 


RDW    13.4


 


Plt Count    147 L


 


Carbonic Acid   1.18 


 


HCO3/H2CO3 Ratio   21:1 


 


ABG pH   7.42 


 


ABG pCO2   39.3 


 


ABG pO2   40.8 L* 


 


ABG HCO3   24.8 H 


 


ABG O2 Saturation   77.1 L 


 


ABG Base Excess   0.4 


 


FiO2   ROOM AIR 


 


Sodium  136.1 L  


 


Potassium  4.8  


 


Chloride  103  


 


Carbon Dioxide  30  


 


Anion Gap  3 L  


 


BUN  13  


 


Creatinine  0.86  


 


Est GFR ( Amer)  > 60  


 


Glucose  118 H  


 


Calcium  8.4  











Impressions: 


                                        





Hip/Pelvis X-Ray  02/17/20 00:00


IMPRESSION:     


Acute, minimally displaced fracture through the lateral aspect of


the right femoral neck.


 








Chest X-Ray  02/18/20 00:00


IMPRESSION:  Obstructive lung disease


Question early or developing infiltrate at the right lung base


 








Chest/Abdomen CTA  02/18/20 00:00


IMPRESSION:  1.  There is no pulmonary embolus.  There is no aortic aneurysm or 

dissection.


2.  Right lower lobe atelectasis.


3.  Subsegmental dependent atelectasis in left lower lobe.


4.  Mild left hilar adenopathy.


 











Status: Imported from PACS





Assessment & Plan





- Diagnosis


(1) Femoral neck fracture


Qualifiers: 


   Encounter type: initial encounter   Fracture type: closed   Laterality: right

  Qualified Code(s): S72.001A - Fracture of unspecified part of neck of right 

femur, initial encounter for closed fracture   


Is this a current diagnosis for this admission?: Yes   


Plan: 


Mobilized with physical therapy and a touchdown weightbearing restriction.  

Patient's desires to return home as opposed to a skilled nursing facility.  

Considerable progress will need to be made with physical therapy before this can

be realized








(2) Osteoporosis


Is this a current diagnosis for this admission?: Yes   





- Time


Time Spent with patient: 15-24 minutes

## 2020-02-20 NOTE — PDOC PROGRESS REPORT
Subjective


Progress Note for:: 02/20/20


Subjective:: 





No adverse events overnight.  No fevers overnight.  Stable on 4 to 5 L per nasal

cannula.  The goal for her SPO2 should be 90 to 92%.


Reason For Visit: 


FALL,FEMORAL NECK FRACTURE








Physical Exam


Vital Signs: 


                                        











Temp Pulse Resp BP Pulse Ox


 


 98.1 F   90   16   142/61 H  94 


 


 02/20/20 10:52  02/20/20 14:13  02/20/20 14:13  02/20/20 10:52  02/20/20 14:13








                                 Intake & Output











 02/19/20 02/20/20 02/21/20





 06:59 06:59 06:59


 


Intake Total 1700 1080 840


 


Output Total 1100 200 


 


Balance 600 880 840


 


Weight 66.2 kg 63.8 kg 








General appearance: PRESENT: no acute distress, cooperative, obese


Respiratory exam: PRESENT: clear to auscultation austen, symmetrical, unlabored.  

ABSENT: accessory muscle use, chest wall tenderness, crackles, prolonged 

expiratory phas, retraction, rhonchi, tachypnea, wheezes


Cardiovascular exam: PRESENT: RRR, +S1, +S2


Pulses: PRESENT: normal carotid pulses


Vascular exam: PRESENT: normal capillary refill


GI/Abdominal exam: PRESENT: normal bowel sounds, soft.  ABSENT: distended, 

guarding, rebound, tenderness


Gentrourinary exam: PRESENT: indwelling catheter - Left nephrostomy


Extremities exam: ABSENT: clubbing, pedal edema


Musculoskeletal exam: PRESENT: normal inspection.  ABSENT: deformity


Neurological exam: PRESENT: alert, awake, oriented to person, oriented to place,

oriented to situation


Psychiatric exam: PRESENT: appropriate affect, normal mood


Skin exam: PRESENT: dry, warm





Results


Laboratory Results: 


                                        





                                 02/20/20 06:20 





                                 02/18/20 05:50 





                                        











  02/20/20





  06:20


 


WBC  10.7 H


 


RBC  3.45 L


 


Hgb  11.3 L D


 


Hct  33.5 L


 


MCV  97


 


MCH  32.7


 


MCHC  33.7


 


RDW  13.3


 


Plt Count  143 L











Impressions: 


                                        





Hip/Pelvis X-Ray  02/17/20 00:00


IMPRESSION:     


Acute, minimally displaced fracture through the lateral aspect of


the right femoral neck.


 








Chest X-Ray  02/18/20 00:00


IMPRESSION:  Obstructive lung disease


Question early or developing infiltrate at the right lung base


 








Chest/Abdomen CTA  02/18/20 00:00


IMPRESSION:  1.  There is no pulmonary embolus.  There is no aortic aneurysm or 

dissection.


2.  Right lower lobe atelectasis.


3.  Subsegmental dependent atelectasis in left lower lobe.


4.  Mild left hilar adenopathy.


 














Assessment and Plan





- Diagnosis


(1) Hypoxemia


Is this a current diagnosis for this admission?: Yes   


Plan: 


She did have fairly diffuse centrilobular emphysema with hyperinflated lungs on 

her chest CT.  We have encouraged incentive spirometry and use of a flutter 

valve.  We will keep a close eye on her in case the areas of atelectasis wind up

turning into a breeding ground for pneumonia.  She will likely have to be 

discharged on some oxygen because she is on 4L and her SPO2 is in the low 90s.








(2) Femoral neck fracture


Qualifiers: 


   Encounter type: initial encounter   Fracture type: closed   Laterality: right

  Qualified Code(s): S72.001A - Fracture of unspecified part of neck of right 

femur, initial encounter for closed fracture   


Is this a current diagnosis for this admission?: Yes   


Plan: 


Per surgery.








- Time


Time Spent with patient: 15-24 minutes

## 2020-02-20 NOTE — PDOC PROGRESS REPORT
Subjective


Progress Note for:: 02/20/20


Reason For Visit: 


FALL,FEMORAL NECK FRACTURE


71-year-old white female now postop day 3 status post right hip arthroplasty for

femoral neck fracture.  Patient sitting up in bed today.  Alert oriented, and 

appropriate.





Physical Exam


Vital Signs: 


                                        











Temp Pulse Resp BP Pulse Ox


 


 36.7 C   71   18   99/46 L  90 L


 


 02/19/20 23:35  02/20/20 01:28  02/20/20 01:28  02/19/20 23:35  02/20/20 01:28








                                 Intake & Output











 02/18/20 02/19/20 02/20/20





 06:59 06:59 06:59


 


Intake Total 5942 1700 720


 


Output Total 5234 1100 200


 


Balance 708 600 520


 


Weight 66.1 kg 66.2 kg 63.8 kg











General appearance: PRESENT: no acute distress, thin


Respiratory exam: PRESENT: unlabored


Cardiovascular exam: PRESENT: RRR


Pulses: PRESENT: +1 pedal pulses bilateral


Vascular exam: PRESENT: normal capillary refill


GI/Abdominal exam: PRESENT: soft


Rectal exam: PRESENT: deferred


Extremities exam: PRESENT: other - Today I can not only internally and actually 

rotate the leg but flex the hip without undue pain.  Right hip dressing is clean

dry and intact.  Leg lengths are equal.  Distal neurovascular examination is 

intact.





Results


Laboratory Results: 


                                        





                                 02/19/20 05:48 





                                 02/18/20 05:50 








Impressions: 


                                        





Hip/Pelvis X-Ray  02/17/20 00:00


IMPRESSION:     


Acute, minimally displaced fracture through the lateral aspect of


the right femoral neck.


 








Chest X-Ray  02/18/20 00:00


IMPRESSION:  Obstructive lung disease


Question early or developing infiltrate at the right lung base


 








Chest/Abdomen CTA  02/18/20 00:00


IMPRESSION:  1.  There is no pulmonary embolus.  There is no aortic aneurysm or 

dissection.


2.  Right lower lobe atelectasis.


3.  Subsegmental dependent atelectasis in left lower lobe.


4.  Mild left hilar adenopathy.


 











Status: Imported from PACS





Assessment & Plan





- Diagnosis


(1) Femoral neck fracture


Qualifiers: 


   Encounter type: initial encounter   Fracture type: closed   Laterality: right

  Qualified Code(s): S72.001A - Fracture of unspecified part of neck of right 

femur, initial encounter for closed fracture   


Is this a current diagnosis for this admission?: Yes   


Plan: 


Mobilized with physical therapy touchdown weightbearing restriction.








(2) Osteoporosis


Is this a current diagnosis for this admission?: Yes   





- Time


Time Spent with patient: 15-24 minutes


Anticipated discharge: Home with Homehealth - Patient remains adamant that she 

wishes to return home with home health services as opposed to skilled nursing 

facility.  Today I have laid out for her that that will entail her being able to

transfer from a bed to a chair to bathroom independently.  She understands the 

functional necessity this and will work diligently towards this as a goal.

## 2020-02-21 NOTE — PDOC PROGRESS REPORT
Subjective


Progress Note for:: 02/21/20


Subjective:: 





No adverse events overnight.  No fevers overnight.  Stable on 3-4 L per nasal 

cannula.  The goal for her SPO2 should be 90 to 92%, and she has been 

consistently in that range on 3 to 4 L.


Reason For Visit: 


FALL,FEMORAL NECK FRACTURE








Physical Exam


Vital Signs: 


                                        











Temp Pulse Resp BP Pulse Ox


 


 98.1 F   83   16   144/65 H  91 L


 


 02/21/20 11:56  02/21/20 14:00  02/21/20 14:00  02/21/20 11:56  02/21/20 14:00








                                 Intake & Output











 02/20/20 02/21/20 02/22/20





 06:59 06:59 06:59


 


Intake Total 1080 1220 300


 


Output Total 200  


 


Balance 880 1220 300


 


Weight 63.8 kg 64.1 kg 








General appearance: PRESENT: no acute distress, cooperative, obese


Respiratory exam: PRESENT: clear to auscultation austen, symmetrical, unlabored.  

ABSENT: accessory muscle use, chest wall tenderness, crackles, prolonged 

expiratory phas, retraction, rhonchi, tachypnea, wheezes


Cardiovascular exam: PRESENT: RRR, +S1, +S2


Pulses: PRESENT: normal carotid pulses


Vascular exam: PRESENT: normal capillary refill


GI/Abdominal exam: PRESENT: normal bowel sounds, soft.  ABSENT: distended, 

guarding, rebound, tenderness


Gentrourinary exam: PRESENT: indwelling catheter - Left nephrostomy


Extremities exam: ABSENT: clubbing, pedal edema


Musculoskeletal exam: PRESENT: normal inspection.  ABSENT: deformity


Neurological exam: PRESENT: alert, awake, oriented to person, oriented to place,

oriented to situation


Psychiatric exam: PRESENT: appropriate affect, normal mood


Skin exam: PRESENT: dry, warm





Results


Laboratory Results: 


                                        





                                 02/20/20 06:20 





                                 02/18/20 05:50 








Impressions: 


                                        





Hip/Pelvis X-Ray  02/17/20 00:00


IMPRESSION:     


Acute, minimally displaced fracture through the lateral aspect of


the right femoral neck.


 








Chest X-Ray  02/18/20 00:00


IMPRESSION:  Obstructive lung disease


Question early or developing infiltrate at the right lung base


 








Chest/Abdomen CTA  02/18/20 00:00


IMPRESSION:  1.  There is no pulmonary embolus.  There is no aortic aneurysm or 

dissection.


2.  Right lower lobe atelectasis.


3.  Subsegmental dependent atelectasis in left lower lobe.


4.  Mild left hilar adenopathy.


 














Assessment and Plan





- Diagnosis


(1) Hypoxemia


Is this a current diagnosis for this admission?: Yes   


Plan: 


She did have fairly diffuse centrilobular emphysema with hyperinflated lungs on 

her chest CT.  We have encouraged incentive spirometry and use of a flutter 

valve.  We will keep a close eye on her in case the areas of atelectasis wind up

turning into a breeding ground for pneumonia, but that has not happened thus 

far.  She will likely have to be discharged on some oxygen because she is on 3-

4L and her SPO2 is in the low 90s.  This is definitely a chronic hypoxemic 

respiratory failure due to centrilobular emphysema.








(2) Femoral neck fracture


Qualifiers: 


   Encounter type: initial encounter   Fracture type: closed   Laterality: right

  Qualified Code(s): S72.001A - Fracture of unspecified part of neck of right 

femur, initial encounter for closed fracture   


Is this a current diagnosis for this admission?: Yes   


Plan: 


Per surgery.








- Time


Time Spent with patient: 15-24 minutes

## 2020-02-21 NOTE — PDOC PROGRESS REPORT
Subjective


Progress Note for:: 02/21/20


Reason For Visit: 


FALL,FEMORAL NECK FRACTURE


71-year-old white female status post right hip arthroplasty for femoral neck 

fracture.  Patient continues to make slow steady progress in terms of increasing

her function with physical therapy.  Pain control seems to be better.





Physical Exam


Vital Signs: 


                                        











Temp Pulse Resp BP Pulse Ox


 


 36.4 C   87   16   107/51 L  89 L


 


 02/20/20 23:42  02/21/20 01:23  02/21/20 01:23  02/20/20 23:42  02/21/20 01:23








                                 Intake & Output











 02/19/20 02/20/20 02/21/20





 06:59 06:59 06:59


 


Intake Total 1700 1080 1220


 


Output Total 1100 200 


 


Balance 


 


Weight 66.2 kg 63.8 kg 64.1 kg











General appearance: PRESENT: no acute distress, mild distress, thin


Musculoskeletal exam: PRESENT: other - Right hip dressing remains clean dry and 

intact.  Leg lengths are equal.  Distal neurovascular examination is intact.





Results


Laboratory Results: 


                                        





                                 02/20/20 06:20 





                                 02/18/20 05:50 





                                        











  02/20/20





  06:20


 


WBC  10.7 H


 


RBC  3.45 L


 


Hgb  11.3 L D


 


Hct  33.5 L


 


MCV  97


 


MCH  32.7


 


MCHC  33.7


 


RDW  13.3


 


Plt Count  143 L











Impressions: 


                                        





Hip/Pelvis X-Ray  02/17/20 00:00


IMPRESSION:     


Acute, minimally displaced fracture through the lateral aspect of


the right femoral neck.


 








Chest X-Ray  02/18/20 00:00


IMPRESSION:  Obstructive lung disease


Question early or developing infiltrate at the right lung base


 








Chest/Abdomen CTA  02/18/20 00:00


IMPRESSION:  1.  There is no pulmonary embolus.  There is no aortic aneurysm or 

dissection.


2.  Right lower lobe atelectasis.


3.  Subsegmental dependent atelectasis in left lower lobe.


4.  Mild left hilar adenopathy.


 











Status: Imported from PACS





Assessment & Plan





- Diagnosis


(1) Femoral neck fracture


Qualifiers: 


   Encounter type: initial encounter   Fracture type: closed   Laterality: right

  Qualified Code(s): S72.001A - Fracture of unspecified part of neck of right 

femur, initial encounter for closed fracture   


Is this a current diagnosis for this admission?: Yes   


Plan: 


Patient is making progress.  She continues steadfast in her goal to return home.

 We again reviewed the functional criteria for her to continue to do so.








(2) Osteoporosis


Is this a current diagnosis for this admission?: Yes   





- Time


Time Spent with patient: 15-24 minutes


Anticipated discharge: Home with Homehealth


Within: Other

## 2020-02-22 NOTE — PDOC PROGRESS REPORT
Subjective


Progress Note for:: 02/22/20


Subjective:: 





No adverse events overnight.  No fevers overnight.  Stable on 3-4 L per nasal 

cannula.  The goal for her SPO2 should be 90 to 92%, and she has been 

consistently in that range on 3 to 4 L.


Reason For Visit: 


FALL,FEMORAL NECK FRACTURE








Physical Exam


Vital Signs: 


                                        











Temp Pulse Resp BP Pulse Ox


 


 98.3 F   86   17   145/69 H  97 


 


 02/22/20 11:02  02/22/20 11:02  02/22/20 11:02  02/22/20 11:02  02/22/20 11:02








                                 Intake & Output











 02/21/20 02/22/20 02/23/20





 06:59 06:59 06:59


 


Intake Total 1220 778 118


 


Balance 1220 778 118


 


Weight 64.1 kg 63 kg 








General appearance: PRESENT: no acute distress, cooperative, obese


Respiratory exam: PRESENT: clear to auscultation austen, symmetrical, unlabored.  

ABSENT: accessory muscle use, chest wall tenderness, crackles, prolonged 

expiratory phas, retraction, rhonchi, tachypnea, wheezes


Cardiovascular exam: PRESENT: RRR, +S1, +S2


Pulses: PRESENT: normal carotid pulses


Vascular exam: PRESENT: normal capillary refill


GI/Abdominal exam: PRESENT: normal bowel sounds, soft.  ABSENT: distended, 

guarding, rebound, tenderness


Gentrourinary exam: PRESENT: indwelling catheter - Left nephrostomy


Extremities exam: ABSENT: clubbing, pedal edema


Musculoskeletal exam: PRESENT: normal inspection.  ABSENT: deformity


Neurological exam: PRESENT: alert, awake, oriented to person, oriented to place,

oriented to situation


Psychiatric exam: PRESENT: appropriate affect, normal mood


Skin exam: PRESENT: dry, warm





Results


Laboratory Results: 


                                        





                                 02/20/20 06:20 





                                 02/18/20 05:50 








Impressions: 


                                        





Hip/Pelvis X-Ray  02/17/20 00:00


IMPRESSION:     


Acute, minimally displaced fracture through the lateral aspect of


the right femoral neck.


 








Chest X-Ray  02/18/20 00:00


IMPRESSION:  Obstructive lung disease


Question early or developing infiltrate at the right lung base


 








Chest/Abdomen CTA  02/18/20 00:00


IMPRESSION:  1.  There is no pulmonary embolus.  There is no aortic aneurysm or 

dissection.


2.  Right lower lobe atelectasis.


3.  Subsegmental dependent atelectasis in left lower lobe.


4.  Mild left hilar adenopathy.


 














Assessment and Plan





- Diagnosis


(1) Hypoxemia


Is this a current diagnosis for this admission?: Yes   


Plan: 


She did have fairly diffuse centrilobular emphysema with hyperinflated lungs on 

her chest CT.  We have encouraged incentive spirometry and use of a flutter 

valve.  We will keep a close eye on her in case the areas of atelectasis wind up

turning into a breeding ground for pneumonia, but that has not happened thus 

far.  She will likely have to be discharged on some oxygen because she is on 3-

4L and her SPO2 is in the low 90s.  This is definitely a chronic hypoxemic 

respiratory failure due to centrilobular emphysema.








(2) Femoral neck fracture


Qualifiers: 


   Encounter type: initial encounter   Fracture type: closed   Laterality: right

  Qualified Code(s): S72.001A - Fracture of unspecified part of neck of right 

femur, initial encounter for closed fracture   


Is this a current diagnosis for this admission?: Yes   


Plan: 


Per surgery.








- Time


Time Spent with patient: 15-24 minutes

## 2020-02-22 NOTE — PDOC PROGRESS REPORT
Subjective


Progress Note for:: 02/22/20


Reason For Visit: 


FALL,FEMORAL NECK FRACTURE


71-year-old female status post right hip arthroplasty for femoral neck fracture.

 Patient making slow progress with physical therapy.  Her stated intention is to

return home with home health services and DME.





Physical Exam


Vital Signs: 


                                        











Temp Pulse Resp BP Pulse Ox


 


 36.7 C   79   16   117/57 L  91 L


 


 02/21/20 23:36  02/22/20 02:55  02/22/20 02:55  02/21/20 23:36  02/22/20 02:55








                                 Intake & Output











 02/21/20 02/22/20 02/23/20





 06:59 06:59 06:59


 


Intake Total 1220 778 


 


Balance 1220 778 


 


Weight 64.1 kg 63 kg 











General appearance: PRESENT: mild distress


Extremities exam: PRESENT: other - Right hip dressing clean dry and intact.  Leg

lengths are equal.  Distal neurovascular examination is intact.





Results


Laboratory Results: 


                                        





                                 02/20/20 06:20 





                                 02/18/20 05:50 








Impressions: 


                                        





Hip/Pelvis X-Ray  02/17/20 00:00


IMPRESSION:     


Acute, minimally displaced fracture through the lateral aspect of


the right femoral neck.


 








Chest X-Ray  02/18/20 00:00


IMPRESSION:  Obstructive lung disease


Question early or developing infiltrate at the right lung base


 








Chest/Abdomen CTA  02/18/20 00:00


IMPRESSION:  1.  There is no pulmonary embolus.  There is no aortic aneurysm or 

dissection.


2.  Right lower lobe atelectasis.


3.  Subsegmental dependent atelectasis in left lower lobe.


4.  Mild left hilar adenopathy.


 














Assessment & Plan





- Diagnosis


(1) Femoral neck fracture


Qualifiers: 


   Encounter type: initial encounter   Fracture type: closed   Laterality: right

  Qualified Code(s): S72.001A - Fracture of unspecified part of neck of right 

femur, initial encounter for closed fracture   


Is this a current diagnosis for this admission?: Yes   


Plan: 


Mobilized with physical therapy and a touchdown weightbearing restriction.  

Anticipate discharge home once functional criteria have been met








(2) Osteoporosis


Is this a current diagnosis for this admission?: Yes   





- Time


Time Spent with patient: 15-24 minutes


Anticipated discharge: Home with Homehealth


Within: Other

## 2020-02-23 NOTE — PDOC PROGRESS REPORT
Subjective


Progress Note for:: 02/23/20


Subjective:: 





No adverse events overnight.  No fevers overnight.  Stable on 3-4 L per nasal 

cannula.  The goal for her SPO2 should be 90 to 92%, and she has been 

consistently in that range on 3 to 4 L.  Eating and drinking without difficulty.

 Continue to work with physical therapy.


Reason For Visit: 


FALL,FEMORAL NECK FRACTURE








Physical Exam


Vital Signs: 


                                        











Temp Pulse Resp BP Pulse Ox


 


 98.8 F   71   16   125/66   93 


 


 02/23/20 11:35  02/23/20 14:20  02/23/20 14:20  02/23/20 11:35  02/23/20 14:20








                                 Intake & Output











 02/22/20 02/23/20 02/24/20





 06:59 06:59 06:59


 


Intake Total 778 576 480


 


Balance 778 576 480


 


Weight 63 kg 60.7 kg 








General appearance: PRESENT: no acute distress, cooperative, obese


Respiratory exam: PRESENT: clear to auscultation austen, symmetrical, unlabored.  

ABSENT: accessory muscle use, chest wall tenderness, crackles, prolonged 

expiratory phas, retraction, rhonchi, tachypnea, wheezes


Cardiovascular exam: PRESENT: RRR, +S1, +S2


Pulses: PRESENT: normal carotid pulses


Vascular exam: PRESENT: normal capillary refill


GI/Abdominal exam: PRESENT: normal bowel sounds, soft.  ABSENT: distended, guard

ing, rebound, tenderness


Gentrourinary exam: PRESENT: indwelling catheter - Left nephrostomy


Extremities exam: ABSENT: clubbing, pedal edema


Musculoskeletal exam: PRESENT: normal inspection.  ABSENT: deformity


Neurological exam: PRESENT: alert, awake, oriented to person, oriented to place,

oriented to situation


Psychiatric exam: PRESENT: appropriate affect, normal mood


Skin exam: PRESENT: dry, warm





Results


Laboratory Results: 


                                        





                                 02/20/20 06:20 





                                 02/18/20 05:50 








Impressions: 


                                        





Hip/Pelvis X-Ray  02/17/20 00:00


IMPRESSION:     


Acute, minimally displaced fracture through the lateral aspect of


the right femoral neck.


 








Chest X-Ray  02/18/20 00:00


IMPRESSION:  Obstructive lung disease


Question early or developing infiltrate at the right lung base


 








Chest/Abdomen CTA  02/18/20 00:00


IMPRESSION:  1.  There is no pulmonary embolus.  There is no aortic aneurysm or 

dissection.


2.  Right lower lobe atelectasis.


3.  Subsegmental dependent atelectasis in left lower lobe.


4.  Mild left hilar adenopathy.


 














Assessment and Plan





- Diagnosis


(1) Hypoxemia


Is this a current diagnosis for this admission?: Yes   


Plan: 


She did have fairly diffuse centrilobular emphysema with hyperinflated lungs on 

her chest CT.  We have encouraged incentive spirometry and use of a flutter 

valve.  We will keep a close eye on her in case the areas of atelectasis wind up

turning into a breeding ground for pneumonia, but that has not happened thus 

far.  She will likely have to be discharged on some oxygen because she is on 3-

4L and her SPO2 is in the low 90s.  This is definitely a chronic hypoxemic 

respiratory failure due to centrilobular emphysema.








(2) Femoral neck fracture


Qualifiers: 


   Encounter type: initial encounter   Fracture type: closed   Laterality: right

  Qualified Code(s): S72.001A - Fracture of unspecified part of neck of right 

femur, initial encounter for closed fracture   


Is this a current diagnosis for this admission?: Yes   


Plan: 


Per surgery.








- Time


Time Spent with patient: 15-24 minutes

## 2020-02-23 NOTE — PDOC PROGRESS REPORT
Subjective


Progress Note for:: 02/23/20


Reason For Visit: 


FALL,FEMORAL NECK FRACTURE


71-year-old white female status post right hip arthroplasty for femoral neck 

fracture.  Patient making progress with physical therapy and has been ambulating

with nursing from bed to bathroom successfully.





Physical Exam


Vital Signs: 


                                        











Temp Pulse Resp BP Pulse Ox


 


 36.8 C   79   16   127/58 H  93 


 


 02/22/20 23:21  02/23/20 02:06  02/23/20 02:06  02/22/20 23:21  02/23/20 02:06








                                 Intake & Output











 02/22/20 02/23/20 02/24/20





 06:59 06:59 06:59


 


Intake Total 778 576 


 


Balance 778 576 


 


Weight 63 kg 60.7 kg 











General appearance: PRESENT: no acute distress, mild distress


Respiratory exam: PRESENT: unlabored


Cardiovascular exam: PRESENT: RRR


Pulses: PRESENT: +1 pedal pulses bilateral


Extremities exam: PRESENT: other - Right hip dressing clean dry and intact.  Leg

lengths are equal.  Distal neurovascular examination is intact.





Results


Laboratory Results: 


                                        





                                 02/20/20 06:20 





                                 02/18/20 05:50 








Impressions: 


                                        





Hip/Pelvis X-Ray  02/17/20 00:00


IMPRESSION:     


Acute, minimally displaced fracture through the lateral aspect of


the right femoral neck.


 








Chest X-Ray  02/18/20 00:00


IMPRESSION:  Obstructive lung disease


Question early or developing infiltrate at the right lung base


 








Chest/Abdomen CTA  02/18/20 00:00


IMPRESSION:  1.  There is no pulmonary embolus.  There is no aortic aneurysm or 

dissection.


2.  Right lower lobe atelectasis.


3.  Subsegmental dependent atelectasis in left lower lobe.


4.  Mild left hilar adenopathy.


 











Status: Imported from PACS





Assessment & Plan





- Diagnosis


(1) Femoral neck fracture


Qualifiers: 


   Encounter type: initial encounter   Fracture type: closed   Laterality: right

  Qualified Code(s): S72.001A - Fracture of unspecified part of neck of right 

femur, initial encounter for closed fracture   


Is this a current diagnosis for this admission?: Yes   


Plan: 


Anticipate discharge home tomorrow with home health services and DME.








(2) Osteoporosis


Is this a current diagnosis for this admission?: Yes   





- Time


Time Spent with patient: 15-24 minutes


Anticipated discharge: Home with Homehealth


Within: within 24 hours

## 2020-02-24 NOTE — PDOC DISCHARGE SUMMARY
Impression





- Admit/DC Date/PCP


Admission Date/Primary Care Provider: 


  02/17/20 06:29





  RENNY BECERRIL MD





Discharge Date: 02/24/20





- Discharge Diagnosis


(1) Femoral neck fracture


Is this a current diagnosis for this admission?: Yes   





(2) Osteoporosis


Is this a current diagnosis for this admission?: Yes   





- Additional Information


Resuscitation Status: Full Code


Discharge Diet: Regular


Discharge Activity: Balance Activity w/Rest, No tub bath


Referrals: 


JOEY MEADE MD [ACTIVE STAFF] - 


Home Medications: 








Albuterol Sulfate [Ventolin Hfa 8 gm Mdi (1 Mdi/ER Disp)] 2 puff IH Q6HP PRN 

12/05/18 


Tramadol HCl [Ultram] 50 mg PO Q8HP PRN  MG (1-2 TAB PER DOSE) 12/05/18 


Cyclobenzaprine HCl [Flexeril 10 mg Tablet] 10 mg PO TIDP PRN 02/17/20 


Gabapentin [Neurontin] 600 mg PO Q8 02/17/20 











History of Present Illiness


History of Present Illness: 





Patient is a 71-year-old white female with a DEXA diagnosed osteoporosis who 

tripped and fell and sustained a right hip injury.  She is brought to emergency 

room where a femoral neck fracture was then applied.  She is subsequent taken to

 the operating room and underwent a right hip arthroplasty.  She is returned to 

the floor in satisfactory addition.  She is ambulate with physical therapy on a 

touchdown weightbearing restriction on the right lower extremity.





Hospital Course


Hospital Course: 


As above





Physical Exam


Vital Signs: 


                                        











Temp Pulse Resp BP Pulse Ox


 


 36.8 C   79   18   121/57 L  922 H


 


 02/23/20 23:56  02/24/20 01:47  02/24/20 01:47  02/23/20 23:56  02/24/20 01:47








                                 Intake & Output











 02/23/20 02/24/20 02/25/20





 06:59 06:59 06:59


 


Intake Total 576 1440 


 


Balance 576 1440 


 


Weight 60.7 kg 60.2 kg 











General appearance: PRESENT: mild distress, thin


Head exam: PRESENT: normocephalic


Respiratory exam: PRESENT: unlabored


Cardiovascular exam: PRESENT: RRR


Pulses: PRESENT: +1 pedal pulses bilateral


Vascular exam: PRESENT: normal capillary refill


GI/Abdominal exam: PRESENT: soft


Rectal exam: PRESENT: deferred


Musculoskeletal exam: PRESENT: other - Right hip dressing clean dry and intact. 

 Leg lengths are equal.  Distal neurovascular semination is intact.





Results


Laboratory Results: 


                                        











WBC  10.7 10^3/uL (4.0-10.5)  H  02/20/20  06:20    


 


RBC  3.45 10^6/uL (3.72-5.28)  L  02/20/20  06:20    


 


Hgb  11.3 g/dL (12.0-15.5)  L D 02/20/20  06:20    


 


Hct  33.5 % (36.0-47.0)  L  02/20/20  06:20    


 


MCV  97 fl (80-97)   02/20/20  06:20    


 


MCH  32.7 pg (27.0-33.4)   02/20/20  06:20    


 


MCHC  33.7 g/dL (32.0-36.0)   02/20/20  06:20    


 


RDW  13.3 % (11.5-14.0)   02/20/20  06:20    


 


Plt Count  143 10^3/uL (150-450)  L  02/20/20  06:20    


 


Lymph % (Auto)  10.0 % (13-45)  L  02/17/20  04:50    


 


Mono % (Auto)  4.3 % (3-13)   02/17/20  04:50    


 


Eos % (Auto)  0.4 % (0-6)   02/17/20  04:50    


 


Baso % (Auto)  0.4 % (0-2)   02/17/20  04:50    


 


Absolute Neuts (auto)  10.2 10^3/uL (1.7-8.2)  H  02/17/20  04:50    


 


Absolute Lymphs (auto)  1.2 10^3/uL (0.5-4.7)   02/17/20  04:50    


 


Absolute Monos (auto)  0.5 10^3/uL (0.1-1.4)   02/17/20  04:50    


 


Absolute Eos (auto)  0.0 10^3/uL (0.0-0.6)   02/17/20  04:50    


 


Absolute Basos (auto)  0.0 10^3/uL (0.0-0.2)   02/17/20  04:50    


 


Seg Neutrophils %  84.9 % (42-78)  H  02/17/20  04:50    


 


PT  11.9 SEC (11.4-15.4)   02/17/20  04:50    


 


INR  0.87   02/17/20  04:50    


 


Carbonic Acid  1.18 mmol/L (1.05-1.35)   02/18/20  13:05    


 


HCO3/H2CO3 Ratio  21:1   02/18/20  13:05    


 


ABG pH  7.42  (7.35-7.45)   02/18/20  13:05    


 


ABG pCO2  39.3 mmHg (35-45)   02/18/20  13:05    


 


ABG pO2  40.8 mmHg ()  L*  02/18/20  13:05    


 


ABG HCO3  24.8 mmol/L (20-24)  H  02/18/20  13:05    


 


ABG Total CO2  26.0 mmol/L (21-25)  H  02/18/20  13:05    


 


ABG O2 Saturation  77.1 % (94-98)  L  02/18/20  13:05    


 


ABG Base Excess  0.4 mmol/L  02/18/20  13:05    


 


FiO2  ROOM AIR   02/18/20  13:05    


 


Sodium  136.1 mmol/L (137-145)  L  02/18/20  05:50    


 


Potassium  4.8 mmol/L (3.6-5.0)   02/18/20  05:50    


 


Chloride  103 mmol/L ()   02/18/20  05:50    


 


Carbon Dioxide  30 mmol/L (22-30)   02/18/20  05:50    


 


Anion Gap  3  (5-19)  L  02/18/20  05:50    


 


BUN  13 mg/dL (7-20)   02/18/20  05:50    


 


Creatinine  0.86 mg/dL (0.52-1.25)   02/18/20  05:50    


 


Est GFR ( Amer)  > 60  (>60)   02/18/20  05:50    


 


Est GFR (MDRD) Non-Af  > 60  (>60)   02/18/20  05:50    


 


Glucose  118 mg/dL ()  H  02/18/20  05:50    


 


POC Glucose  111 mg/dL ()  H  02/19/20  15:56    


 


Calcium  8.4 mg/dL (8.4-10.2)   02/18/20  05:50    


 


Urine Color  YELLOW   02/17/20  05:35    


 


Urine Appearance  CLEAR   02/17/20  05:35    


 


Urine pH  7.0  (5.0-9.0)   02/17/20  05:35    


 


Ur Specific Gravity  1.016   02/17/20  05:35    


 


Urine Protein  NEGATIVE mg/dL (NEGATIVE)   02/17/20  05:35    


 


Urine Glucose (UA)  NEGATIVE mg/dL (NEGATIVE)   02/17/20  05:35    


 


Urine Ketones  NEGATIVE mg/dL (NEGATIVE)   02/17/20  05:35    


 


Urine Blood  NEGATIVE  (NEGATIVE)   02/17/20  05:35    


 


Urine Nitrite  NEGATIVE  (NEGATIVE)   02/17/20  05:35    


 


Urine Bilirubin  NEGATIVE  (NEGATIVE)   02/17/20  05:35    


 


Urine Urobilinogen  NEGATIVE mg/dL (<2.0)   02/17/20  05:35    


 


Ur Leukocyte Esterase  NEGATIVE  (NEGATIVE)   02/17/20  05:35    


 


Urine WBC (Auto)  1 /HPF  02/17/20  05:35    


 


Urine RBC (Auto)  15 /HPF  02/17/20  05:35    


 


U Hyaline Cast (Auto)  1 /LPF  02/17/20  05:35    


 


Squamous Epi Cells Auto  <1 /HPF  02/17/20  05:35    


 


Urine Mucus (Auto)  RARE /LPF  02/17/20  05:35    


 


Urine Ascorbic Acid  NEGATIVE  (NEGATIVE)   02/17/20  05:35    











Impressions: 


                                        





Hip/Pelvis X-Ray  02/17/20 00:00


IMPRESSION:     


Acute, minimally displaced fracture through the lateral aspect of


the right femoral neck.


 








Chest X-Ray  02/17/20 04:19


IMPRESSION: 


 


Clear lungs.


 








Chest X-Ray  02/18/20 00:00


IMPRESSION:  Obstructive lung disease


Question early or developing infiltrate at the right lung base


 








Chest/Abdomen CTA  02/18/20 00:00


IMPRESSION:  1.  There is no pulmonary embolus.  There is no aortic aneurysm or 

dissection.


2.  Right lower lobe atelectasis.


3.  Subsegmental dependent atelectasis in left lower lobe.


4.  Mild left hilar adenopathy.


 














Plan


Plan of Treatment: 


Charge home with home health services and DME on a touchdown weightbearing 

restriction on the right lower extremity.  Follow-up with Dr. Meade and 

[facility] for surgery in 2 weeks for staple removal.





Stroke


Is this a Stroke Patient?: No


Stroke Pt being discharged on Anti-thrombolytic therapy?: Yes





Acute Heart Failure





- **


Is this a Heart Failure Patient?: No